# Patient Record
Sex: MALE | Race: WHITE | NOT HISPANIC OR LATINO | Employment: FULL TIME | URBAN - METROPOLITAN AREA
[De-identification: names, ages, dates, MRNs, and addresses within clinical notes are randomized per-mention and may not be internally consistent; named-entity substitution may affect disease eponyms.]

---
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
## 2025-05-02 ENCOUNTER — TELEPHONE (OUTPATIENT)
Dept: PRIMARY CARE | Facility: CLINIC | Age: 20
End: 2025-05-02
Payer: COMMERCIAL

## 2025-05-02 NOTE — TELEPHONE ENCOUNTER
Patient's mom is requesting an Rx for an anti-viral for stomach concerns while out of the country.    He is going to [place] in [place]

## 2025-05-08 DIAGNOSIS — A09 TRAVELER'S DIARRHEA: Primary | ICD-10-CM

## 2025-05-08 RX ORDER — CIPROFLOXACIN 250 MG/1
250 TABLET, FILM COATED ORAL 2 TIMES DAILY
Qty: 14 TABLET | Refills: 0 | Status: SHIPPED | OUTPATIENT
Start: 2025-05-08 | End: 2025-05-15

## 2025-05-08 NOTE — PROGRESS NOTES
Advise that Cipro called in for traveler's diarrhea.   This is an anti bacterial medication and would only use if needed or directed by healthcare personnel.    This was not a full evaluation for travel advise and if not being advised by traveling group, would suggest  Travel clinic.

## 2025-06-30 ENCOUNTER — OFFICE VISIT (OUTPATIENT)
Dept: PRIMARY CARE | Facility: CLINIC | Age: 20
End: 2025-06-30
Payer: COMMERCIAL

## 2025-06-30 VITALS
RESPIRATION RATE: 16 BRPM | OXYGEN SATURATION: 100 % | DIASTOLIC BLOOD PRESSURE: 78 MMHG | TEMPERATURE: 98.7 F | BODY MASS INDEX: 19.1 KG/M2 | WEIGHT: 126 LBS | HEIGHT: 68 IN | SYSTOLIC BLOOD PRESSURE: 114 MMHG | HEART RATE: 96 BPM

## 2025-06-30 DIAGNOSIS — B34.9 VIRAL SYNDROME: Primary | ICD-10-CM

## 2025-06-30 PROBLEM — Q21.12 PATENT FORAMEN OVALE (HHS-HCC): Status: RESOLVED | Noted: 2023-02-28 | Resolved: 2025-06-30

## 2025-06-30 PROCEDURE — 3008F BODY MASS INDEX DOCD: CPT | Performed by: INTERNAL MEDICINE

## 2025-06-30 PROCEDURE — 99213 OFFICE O/P EST LOW 20 MIN: CPT | Performed by: INTERNAL MEDICINE

## 2025-06-30 RX ORDER — METHYLPREDNISOLONE 4 MG/1
TABLET ORAL
Qty: 21 TABLET | Refills: 0 | Status: SHIPPED | OUTPATIENT
Start: 2025-06-30 | End: 2025-07-06

## 2025-06-30 RX ORDER — CIPROFLOXACIN 250 MG/1
250 TABLET, FILM COATED ORAL 2 TIMES DAILY
COMMUNITY

## 2025-06-30 RX ORDER — ACETAMINOPHEN 500 MG
1000 TABLET ORAL EVERY 8 HOURS PRN
Qty: 30 TABLET | Refills: 0 | Status: SHIPPED | OUTPATIENT
Start: 2025-06-30 | End: 2025-07-10

## 2025-06-30 ASSESSMENT — PATIENT HEALTH QUESTIONNAIRE - PHQ9
2. FEELING DOWN, DEPRESSED OR HOPELESS: NOT AT ALL
1. LITTLE INTEREST OR PLEASURE IN DOING THINGS: NOT AT ALL
SUM OF ALL RESPONSES TO PHQ9 QUESTIONS 1 AND 2: 0

## 2025-06-30 ASSESSMENT — PAIN SCALES - GENERAL: PAINLEVEL_OUTOF10: 3

## 2025-06-30 NOTE — PROGRESS NOTES
Subjective   Corey Cooper is a 20 y.o. male who presents for Sick Visit (Chest congestion for the last 2-3 weeks /Just started taking Cipro that was prescribed for is [place] trip /Has been taking Mucinex, day/night quill /Has been getting hot and cold, no fever ).      History of Present Illness  The patient presents for evaluation of a persistent cough.    Persistent Cough and Associated Symptoms  He recently returned from a trip to [place], during which he experienced a mild cough and fatigue. Prior to the trip, he had been dealing with chest congestion, which he attributes to lack of sleep due to frequent travel. He was taking Mucinex before the trip, which seemed to alleviate his symptoms. However, upon his return on 06/28/2025, he experienced a significant increase in symptoms, including chest tightness during the flight. He reports no history of breathing issues, asthma, or recurrent bronchitis. He spent the entire day in bed on 06/29/2025 due to severe chest tightness. He has been taking Mucinex twice daily, which has helped to break up the congestion and facilitate coughing up mucus. He reports no sore throat but does have nasal congestion and a stiff neck. He has been using Vicks for relief. He has not had a fever but feels flushed. He has been taking Cipro for traveler's diarrhea, which he believes also helps with respiratory issues.  - Onset: Mild cough and fatigue during trip to [place]; significant increase in symptoms upon return on 06/28/2025.  - Location: Chest congestion, chest tightness, nasal congestion, stiff neck.  - Duration: Persistent since return from trip.  - Character: Mild cough, fatigue, chest tightness, nasal congestion, stiff neck.  - Alleviating/Aggravating Factors: Mucinex alleviated symptoms before the trip; Mucinex twice daily helps break up congestion; Vicks for relief; Cipro for traveler's diarrhea and respiratory issues.  - Timing: Significant increase in symptoms upon  "return on 06/28/2025; severe chest tightness on 06/29/2025.  - Severity: Severe chest tightness leading to spending entire day in bed; no fever but feels flushed.    Occasional Anxiety and Racing Heart Rate  He also reports occasional anxiety related to a racing heart rate, which occurred yesterday. He recalls an incident last year when he was ill and his heart rate increased to over 160-170 beats per minute, leading to an ER visit where he was given fluids and medication to lower his heart rate. He believes these episodes may be related to oxygen levels. He maintains good hydration habits.  - Onset: Occasional anxiety related to racing heart rate; incident last year with heart rate over 160-170 beats per minute.  - Character: Racing heart rate, anxiety.  - Alleviating/Aggravating Factors: ER visit with fluids and medication to lower heart rate; believes episodes may be related to oxygen levels.  - Timing: Occurred yesterday; incident last year.  - Severity: Heart rate increased to over 160-170 beats per minute leading to ER visit.    He underwent PFO repair in 2021.    PAST SURGICAL HISTORY:  PFO repair in 2021.      Review of Systems    Objective   /78   Pulse 96   Temp 37.1 °C (98.7 °F)   Resp 16   Ht 1.727 m (5' 8\")   Wt 57.2 kg (126 lb)   SpO2 100%   BMI 19.16 kg/m²       Physical Exam  Constitutional:       Appearance: Normal appearance.   HENT:      Head: Normocephalic.      Right Ear: Tympanic membrane, ear canal and external ear normal.      Left Ear: Tympanic membrane, ear canal and external ear normal.      Nose: Nose normal.      Mouth/Throat:      Mouth: Mucous membranes are moist.      Pharynx: Oropharynx is clear.   Eyes:      Conjunctiva/sclera: Conjunctivae normal.   Cardiovascular:      Rate and Rhythm: Normal rate and regular rhythm.      Heart sounds: Normal heart sounds.   Pulmonary:      Effort: Pulmonary effort is normal.      Breath sounds: Normal breath sounds.   Abdominal:      " General: Abdomen is flat.      Palpations: Abdomen is soft.   Musculoskeletal:         General: Normal range of motion.      Cervical back: Neck supple.   Skin:     General: Skin is warm and dry.   Neurological:      General: No focal deficit present.      Mental Status: He is alert and oriented to person, place, and time.   Psychiatric:         Mood and Affect: Mood normal.         Assessment & Plan  Viral syndrome    Orders:    methylPREDNISolone (Medrol Dospak) 4 mg tablets; Take as directed on package.    acetaminophen (Tylenol Extra Strength) 500 mg tablet; Take 2 tablets (1,000 mg) by mouth every 8 hours if needed for mild pain (1 - 3), moderate pain (4 - 6) or fever (temp greater than 38.0 C) for up to 10 days.       Assessment & Plan  1. Viral infection: Acute.  - Symptoms suggest a viral infection, likely exacerbated by recent travel and lack of sleep. Physical exam reveals no wheezing; reports coughing up mucus and chest tightness.  - Initiated a short course of Medrol Dosepak to reduce congestion and inflammation.  - Tylenol 500 mg, 2 tablets three times a day, recommended to manage achiness and maintain a low temperature.  - Continue the current course of Cipro.    2. Elevated heart rate.  - Elevated heart rate noted, likely due to the current illness and dehydration. Heart rate recorded at 100-110 bpm; no fever detected during the visit.  - Advised to increase fluid intake to help manage heart rate.  - Monitoring of heart rate and symptoms recommended, with a focus on hydration and rest.    Follow-up  - Work excuse provided for today and Wednesday.      Dixon Mckinney,    This medical note was created with the assistance of artificial intelligence (AI) for documentation purposes. The content has been reviewed and confirmed by the healthcare provider for accuracy and completeness. Patient consented to the use of audio recording and use of AI during their visit.

## 2025-06-30 NOTE — LETTER
July 2, 2025     Patient: Corey Cooper   YOB: 2005   Date of Visit: 6/30/2025       To Whom It May Concern:    Corey Cooper was seen in my clinic on 6/30/2025 at 2:40 pm. Please excuse Corey for his absence from work on this day to make the appointment, and through Wednesday.  He may Return to Work Thursday the 3rd of July.      If you have any questions or concerns, please don't hesitate to call.         Sincerely,         Dixon Mckinney,         CC: No Recipients

## 2025-07-10 ENCOUNTER — APPOINTMENT (OUTPATIENT)
Dept: PRIMARY CARE | Facility: CLINIC | Age: 20
End: 2025-07-10
Payer: COMMERCIAL

## 2025-09-11 ENCOUNTER — APPOINTMENT (OUTPATIENT)
Dept: PRIMARY CARE | Facility: CLINIC | Age: 20
End: 2025-09-11
Payer: COMMERCIAL